# Patient Record
Sex: MALE | Race: WHITE | NOT HISPANIC OR LATINO | URBAN - NONMETROPOLITAN AREA
[De-identification: names, ages, dates, MRNs, and addresses within clinical notes are randomized per-mention and may not be internally consistent; named-entity substitution may affect disease eponyms.]

---

## 2019-04-22 DIAGNOSIS — R06.02 SOB (SHORTNESS OF BREATH): Primary | ICD-10-CM

## 2019-04-23 ENCOUNTER — HOSPITAL ENCOUNTER (OUTPATIENT)
Dept: GENERAL RADIOLOGY | Facility: HOSPITAL | Age: 64
Discharge: HOME | End: 2019-04-23
Admitting: INTERNAL MEDICINE

## 2019-04-23 PROCEDURE — 71046 X-RAY EXAM CHEST 2 VIEWS: CPT

## 2019-04-23 PROCEDURE — 71046 X-RAY EXAM CHEST 2 VIEWS: CPT | Performed by: RADIOLOGY

## 2019-04-24 ENCOUNTER — OFFICE VISIT (OUTPATIENT)
Dept: PULMONOLOGY | Facility: CLINIC | Age: 64
End: 2019-04-24

## 2019-04-24 VITALS
TEMPERATURE: 98 F | BODY MASS INDEX: 28.37 KG/M2 | OXYGEN SATURATION: 98 % | WEIGHT: 198.2 LBS | SYSTOLIC BLOOD PRESSURE: 161 MMHG | HEIGHT: 70 IN | DIASTOLIC BLOOD PRESSURE: 75 MMHG | HEART RATE: 87 BPM

## 2019-04-24 DIAGNOSIS — J44.9 CHRONIC OBSTRUCTIVE PULMONARY DISEASE, UNSPECIFIED COPD TYPE (HCC): Primary | ICD-10-CM

## 2019-04-24 DIAGNOSIS — J60 CWP (COALWORKERS PNEUMOCONIOSIS) (HCC): ICD-10-CM

## 2019-04-24 LAB
FEV1: 1.44 LITERS
FVC VOL RESPIRATORY: 3.18 LITERS

## 2019-04-24 PROCEDURE — 99202 OFFICE O/P NEW SF 15 MIN: CPT | Performed by: INTERNAL MEDICINE

## 2019-04-24 PROCEDURE — 94010 BREATHING CAPACITY TEST: CPT | Performed by: INTERNAL MEDICINE

## 2019-04-24 RX ORDER — AMLODIPINE BESYLATE 10 MG/1
1 TABLET ORAL
COMMUNITY

## 2019-04-24 RX ORDER — INSULIN GLARGINE 300 U/ML
INJECTION, SOLUTION SUBCUTANEOUS
COMMUNITY
Start: 2019-03-15

## 2019-04-24 RX ORDER — GLIMEPIRIDE 2 MG/1
1 TABLET ORAL
COMMUNITY

## 2019-04-24 RX ORDER — HYDROXYCHLOROQUINE SULFATE 200 MG/1
1 TABLET, FILM COATED ORAL EVERY 12 HOURS
COMMUNITY
Start: 2017-04-25

## 2019-04-24 RX ORDER — ATENOLOL 50 MG/1
1 TABLET ORAL
COMMUNITY

## 2019-04-24 RX ORDER — PREDNISONE 1 MG/1
3 TABLET ORAL
COMMUNITY
Start: 2017-03-16

## 2019-04-24 RX ORDER — HYDRALAZINE HYDROCHLORIDE 50 MG/1
TABLET, FILM COATED ORAL
COMMUNITY
Start: 2019-02-26

## 2019-04-24 RX ORDER — CLONIDINE HYDROCHLORIDE 0.2 MG/1
TABLET ORAL
COMMUNITY
Start: 2019-02-27

## 2019-04-24 RX ORDER — PANTOPRAZOLE SODIUM 40 MG/1
1 TABLET, DELAYED RELEASE ORAL
COMMUNITY

## 2019-04-24 RX ORDER — ASPIRIN 81 MG/1
1 TABLET ORAL
COMMUNITY

## 2019-04-24 ASSESSMENT — PULMONARY FUNCTION TESTS
FEV1: 1.44
FVC: 3.18

## 2019-04-24 NOTE — PROGRESS NOTES
Subjective   No chief complaint on file.      Jocelyn Shah is a 64 y.o. male     History of Present Illness 64-year-old male referred for evaluation of shortness of breath and lack of energy he gives history of smoking up to 3 packs a day for about 40 years however he quit smoking 5 months ago and currently uses Nicorette gums.  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    Social History     Socioeconomic History   • Marital status: Unknown     Spouse name: Not on file   • Number of children: Not on file   • Years of education: Not on file   • Highest education level: Not on file   Tobacco Use   • Smoking status: Current Some Day Smoker   • Smokeless tobacco: Never Used   Substance and Sexual Activity   • Alcohol use: No     Frequency: Never   • Drug use: No        Physical Exam: No acute distress vital signs are stable blood pressure 161/75 O2 sat 98% no acute distress lungs clear heart regular no clubbing cyanosis or joint deformity to suggest rheumatoid arthritis    PFT: FVC 72 FEV1 44% moderate obstructive impairment    Imaging: Chest x-ray shows small densities of P/S type fusion of 1/!  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

## 2019-05-29 ENCOUNTER — OFFICE VISIT (OUTPATIENT)
Dept: PULMONOLOGY | Facility: CLINIC | Age: 64
End: 2019-05-29

## 2019-05-29 VITALS
TEMPERATURE: 98 F | DIASTOLIC BLOOD PRESSURE: 69 MMHG | BODY MASS INDEX: 27.49 KG/M2 | SYSTOLIC BLOOD PRESSURE: 134 MMHG | HEART RATE: 89 BPM | HEIGHT: 70 IN | WEIGHT: 192 LBS | OXYGEN SATURATION: 99 %

## 2019-05-29 DIAGNOSIS — J60 CWP (COALWORKERS PNEUMOCONIOSIS) (HCC): ICD-10-CM

## 2019-05-29 DIAGNOSIS — J44.9 CHRONIC OBSTRUCTIVE PULMONARY DISEASE, UNSPECIFIED COPD TYPE (HCC): Primary | ICD-10-CM

## 2019-05-29 LAB
FEV1: 2.54 LITERS
FVC VOL RESPIRATORY: 3.63 LITERS

## 2019-05-29 PROCEDURE — 99212 OFFICE O/P EST SF 10 MIN: CPT | Performed by: INTERNAL MEDICINE

## 2019-05-29 PROCEDURE — 94010 BREATHING CAPACITY TEST: CPT | Performed by: INTERNAL MEDICINE

## 2019-05-29 ASSESSMENT — PULMONARY FUNCTION TESTS
FEV1: 2.54
FVC: 3.63

## 2019-05-29 NOTE — PROGRESS NOTES
Subjective   Chief Complaint   Patient presents with   • COPD       Jocelyn Shah is a 64 y.o. 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  His PFT showed moderate obstructive impairment with FVC of 72 FEV1 of 44% was given a sample of Trelegy that his insurance did not cover so he gave him the substitutes of Advair and Spiriva he use that regularly and felt better and did not need to use the Ventolin rescue inhaler.  Quit smoking December 2018    Review of Systems seem to have less shortness of breath not much cough or expectoration    Family History   Problem Relation Age of Onset   • Cancer Mother    • Cancer Sister        Past Medical History:   Diagnosis Date   • Diabetes mellitus (CMS/[facility])    • Hypertension        No past surgical history on file.    Social History     Socioeconomic History   • Marital status: Unknown     Spouse name: Not on file   • Number of children: Not on file   • Years of education: Not on file   • Highest education level: Not on file   Tobacco Use   • Smoking status: Current Some Day Smoker   • Smokeless tobacco: Never Used   Substance and Sexual Activity   • Alcohol use: No     Frequency: Never   • Drug use: No        Physical Exam: No acute distress vital signs are stable lungs clear heart regular no clubbing cyanosis or edema    PFT: Much better FVC 82 FEV1 77% indicating mild obstructive impairment    Imaging: Chest x-ray last visit showed a small densities of P/S type category 1    Other Labs:       ASSESSMENT1-COPD currently mild and controlled2-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  Reminded him of getting annual flu  vaccine and pneumonia vaccine per protocol    Follow up: Return in 4 months with PFT or earlier as needed

## 2019-09-24 ENCOUNTER — OFFICE VISIT (OUTPATIENT)
Dept: PULMONOLOGY | Facility: CLINIC | Age: 64
End: 2019-09-24

## 2019-09-24 VITALS
TEMPERATURE: 98 F | SYSTOLIC BLOOD PRESSURE: 126 MMHG | HEART RATE: 81 BPM | DIASTOLIC BLOOD PRESSURE: 74 MMHG | HEIGHT: 70 IN | BODY MASS INDEX: 24.74 KG/M2 | OXYGEN SATURATION: 98 % | WEIGHT: 172.8 LBS

## 2019-09-24 DIAGNOSIS — J60 CWP (COALWORKERS PNEUMOCONIOSIS) (HCC): ICD-10-CM

## 2019-09-24 DIAGNOSIS — J44.9 CHRONIC OBSTRUCTIVE PULMONARY DISEASE, UNSPECIFIED COPD TYPE (HCC): Primary | ICD-10-CM

## 2019-09-24 LAB
FEV1: 2.3 LITERS
FVC VOL RESPIRATORY: 3.18 LITERS

## 2019-09-24 PROCEDURE — 94010 BREATHING CAPACITY TEST: CPT | Performed by: INTERNAL MEDICINE

## 2019-09-24 PROCEDURE — 99212 OFFICE O/P EST SF 10 MIN: CPT | Performed by: INTERNAL MEDICINE

## 2019-09-24 ASSESSMENT — PULMONARY FUNCTION TESTS
FEV1: 2.30
FVC: 3.18

## 2019-09-24 NOTE — PROGRESS NOTES
Subjective   Chief Complaint   Patient presents with   • COPD       Jocelyn Shah is a 64 y.o. male     History of Present Illness 64-year-old gentleman returning for follow-up of COPD and question of first stage of black lung.  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  His first visit with us was April 20 18 with his PFT showing moderate obstructive impairment however since he quit smoking and has taken some medications that has improved currently on no occasions for his long stay were quite expensive he also has diabetes and hypertension that are under control and some arthritis    Review of Systems has lost about 20 pounds with his diabetes medications being changed however denies having much cough expectoration chest pain    Family History   Problem Relation Age of Onset   • Cancer Mother    • Cancer Sister        Past Medical History:   Diagnosis Date   • Diabetes mellitus (CMS/HCC)    • Hypertension        No past surgical history on file.    Social History     Socioeconomic History   • Marital status: Unknown     Spouse name: Not on file   • Number of children: Not on file   • Years of education: Not on file   • Highest education level: Not on file   Tobacco Use   • Smoking status: Current Some Day Smoker   • Smokeless tobacco: Never Used   Substance and Sexual Activity   • Alcohol use: No     Frequency: Never   • Drug use: No        Physical Exam: No acute distress vital signs are stable weight 170 pounds right of 510 O2 sat 98% of lungs clear heart regular    PFT: FVC 72 FEV1 70% indicating mild obstructive impairment    Imaging: Last chest x-ray was April 2019 that showed minimal small densities in the upper lungs compatible with stage I of black lung he had a couple of CAT  scans in [place] was told it was okay    Other Labs:       ASSESSMENT1-OPD mild1-workers pneumoconiosis stage I per x-ray of April 2019        Recommendations: Reminded him of getting annual flu vaccine pneumonia vaccine per protocol and get annual CAT scan he thinks that he had a CAT scan in September last year in [place] suggested getting on as soon as possible and have primary care physician to continue his weight loss    Follow up: Follow-up with your family physician and recommended getting annual CAT scan for lung cancer screening as he quit smoking a year ago pain to him that I will be retiring soon and he can be followed by my replacement physician in case of change in his CAT scan

## 2021-07-29 ENCOUNTER — HOSPITAL ENCOUNTER (OUTPATIENT)
Dept: HOSPITAL 79 - CT | Age: 66
End: 2021-07-29
Attending: INTERNAL MEDICINE
Payer: MEDICARE

## 2021-07-29 DIAGNOSIS — R63.4: Primary | ICD-10-CM

## 2021-07-29 DIAGNOSIS — K76.89: ICD-10-CM

## 2021-07-29 DIAGNOSIS — D69.6: ICD-10-CM

## 2021-07-29 DIAGNOSIS — D50.9: ICD-10-CM

## 2021-09-28 ENCOUNTER — HOSPITAL ENCOUNTER (OUTPATIENT)
Dept: HOSPITAL 79 - US | Age: 66
End: 2021-09-28
Attending: INTERNAL MEDICINE
Payer: MEDICARE

## 2021-09-28 DIAGNOSIS — D69.6: ICD-10-CM

## 2021-09-28 DIAGNOSIS — R63.4: ICD-10-CM

## 2021-09-28 DIAGNOSIS — D50.9: ICD-10-CM

## 2021-09-28 DIAGNOSIS — K76.89: ICD-10-CM

## 2021-09-28 DIAGNOSIS — R18.8: Primary | ICD-10-CM

## 2021-09-28 LAB
HGB BLD-MCNC: 8.3 GM/DL (ref 14–17.5)
RED BLOOD COUNT: 2.67 M/UL (ref 4.2–5.5)
WHITE BLOOD COUNT: 9.8 K/UL (ref 4.5–11)

## 2021-10-28 ENCOUNTER — HOSPITAL ENCOUNTER (OUTPATIENT)
Dept: HOSPITAL 79 - CT | Age: 66
End: 2021-10-28
Attending: INTERNAL MEDICINE
Payer: MEDICARE

## 2021-10-28 DIAGNOSIS — R63.4: ICD-10-CM

## 2021-10-28 DIAGNOSIS — K76.9: ICD-10-CM

## 2021-10-28 DIAGNOSIS — D69.6: ICD-10-CM

## 2021-10-28 DIAGNOSIS — K76.89: Primary | ICD-10-CM

## 2021-10-28 DIAGNOSIS — R16.1: ICD-10-CM

## 2021-10-28 DIAGNOSIS — D50.9: ICD-10-CM

## 2021-10-28 DIAGNOSIS — R18.8: ICD-10-CM

## 2021-11-08 ENCOUNTER — HOSPITAL ENCOUNTER (OUTPATIENT)
Dept: HOSPITAL 79 - OPSV | Age: 66
End: 2021-11-08
Attending: INTERNAL MEDICINE
Payer: MEDICARE

## 2021-11-08 VITALS — WEIGHT: 163 LBS | BODY MASS INDEX: 23.34 KG/M2 | HEIGHT: 70 IN

## 2021-11-08 DIAGNOSIS — C25.3: Primary | ICD-10-CM

## 2021-11-08 PROCEDURE — P9035 PLATELET PHERES LEUKOREDUCED: HCPCS

## 2021-12-20 ENCOUNTER — HOSPITAL ENCOUNTER (OUTPATIENT)
Dept: HOSPITAL 79 - OPSV | Age: 66
End: 2021-12-20
Attending: INTERNAL MEDICINE
Payer: MEDICARE

## 2021-12-20 VITALS — HEIGHT: 70 IN | BODY MASS INDEX: 23.34 KG/M2 | WEIGHT: 163 LBS

## 2021-12-20 DIAGNOSIS — C25.3: Primary | ICD-10-CM

## 2021-12-20 PROCEDURE — P9035 PLATELET PHERES LEUKOREDUCED: HCPCS

## 2022-01-19 ENCOUNTER — HOSPITAL ENCOUNTER (OUTPATIENT)
Dept: HOSPITAL 79 - US | Age: 67
End: 2022-01-19
Attending: INTERNAL MEDICINE
Payer: MEDICARE

## 2022-01-19 DIAGNOSIS — R18.8: Primary | ICD-10-CM

## 2022-02-03 ENCOUNTER — HOSPITAL ENCOUNTER (OUTPATIENT)
Dept: HOSPITAL 79 - OPSV | Age: 67
End: 2022-02-03
Attending: INTERNAL MEDICINE
Payer: MEDICARE

## 2022-02-03 DIAGNOSIS — C22.1: Primary | ICD-10-CM

## 2022-02-03 DIAGNOSIS — R18.8: ICD-10-CM

## 2022-02-17 ENCOUNTER — HOSPITAL ENCOUNTER (OUTPATIENT)
Dept: HOSPITAL 79 - US | Age: 67
End: 2022-02-17
Attending: INTERNAL MEDICINE
Payer: MEDICARE

## 2022-02-17 DIAGNOSIS — R63.4: ICD-10-CM

## 2022-02-17 DIAGNOSIS — D69.6: ICD-10-CM

## 2022-02-17 DIAGNOSIS — K76.89: ICD-10-CM

## 2022-02-17 DIAGNOSIS — R18.8: Primary | ICD-10-CM

## 2022-02-17 DIAGNOSIS — D50.9: ICD-10-CM
